# Patient Record
Sex: MALE | Race: WHITE
[De-identification: names, ages, dates, MRNs, and addresses within clinical notes are randomized per-mention and may not be internally consistent; named-entity substitution may affect disease eponyms.]

---

## 2018-06-25 ENCOUNTER — HOSPITAL ENCOUNTER (EMERGENCY)
Dept: HOSPITAL 97 - ER | Age: 33
Discharge: HOME | End: 2018-06-25
Payer: OTHER GOVERNMENT

## 2018-06-25 DIAGNOSIS — M54.30: Primary | ICD-10-CM

## 2018-06-25 LAB
ALBUMIN SERPL BCP-MCNC: 3.8 G/DL (ref 3.4–5)
ALP SERPL-CCNC: 80 U/L (ref 45–117)
ALT SERPL W P-5'-P-CCNC: 58 U/L (ref 12–78)
AST SERPL W P-5'-P-CCNC: 29 U/L (ref 15–37)
BUN BLD-MCNC: 16 MG/DL (ref 7–18)
GLUCOSE SERPLBLD-MCNC: 84 MG/DL (ref 74–106)
HCT VFR BLD CALC: 45.3 % (ref 39.6–49)
LYMPHOCYTES # SPEC AUTO: 2.9 K/UL (ref 0.7–4.9)
MCH RBC QN AUTO: 30.7 PG (ref 27–35)
MCV RBC: 87.9 FL (ref 80–100)
PMV BLD: 9.9 FL (ref 7.6–11.3)
POTASSIUM SERPL-SCNC: 3.4 MMOL/L (ref 3.5–5.1)
RBC # BLD: 5.15 M/UL (ref 4.33–5.43)

## 2018-06-25 PROCEDURE — 96361 HYDRATE IV INFUSION ADD-ON: CPT

## 2018-06-25 PROCEDURE — 81003 URINALYSIS AUTO W/O SCOPE: CPT

## 2018-06-25 PROCEDURE — 72131 CT LUMBAR SPINE W/O DYE: CPT

## 2018-06-25 PROCEDURE — 80053 COMPREHEN METABOLIC PANEL: CPT

## 2018-06-25 PROCEDURE — 96374 THER/PROPH/DIAG INJ IV PUSH: CPT

## 2018-06-25 PROCEDURE — 99284 EMERGENCY DEPT VISIT MOD MDM: CPT

## 2018-06-25 PROCEDURE — 82962 GLUCOSE BLOOD TEST: CPT

## 2018-06-25 PROCEDURE — 85025 COMPLETE CBC W/AUTO DIFF WBC: CPT

## 2018-06-25 PROCEDURE — 36415 COLL VENOUS BLD VENIPUNCTURE: CPT

## 2018-06-25 PROCEDURE — 96375 TX/PRO/DX INJ NEW DRUG ADDON: CPT

## 2018-06-25 NOTE — EDPHYS
Physician Documentation                                                                           

 [facility]                                                                

Name: Rick Cifuentes                                                                                 

Age: 32 yrs                                                                                       

Sex: Male                                                                                         

: 1985                                                                                   

MRN: [id number]                                                                                   

Arrival Date: 2018                                                                          

Time: 17:39                                                                                       

Account#: [id number]                                                                            

Bed 6                                                                                             

Private MD: None, None                                                                            

ED Physician Eliceo Siddiqi                                                                      

HPI:                                                                                              

                                                                                             

18:14 This 32 yrs old  Male presents to ER via Ambulatory with complaints of Back    louisa 

      Pain.                                                                                       

18:14 The patient presents with pain that is acute. The symptoms are located in the lumbar    louisa 

      area, left low back, left mid back, right mid back and right low back. Onset: The           

      symptoms/episode began/occurred 2 day(s) ago. The pain does not radiate. Associated         

      signs and symptoms: The patient has no apparent associated signs or symptoms. Modifying     

      factors: The patient symptoms are alleviated by remaining still, the patient symptoms       

      are aggravated by bending. Severity of symptoms: At their worst the symptoms were           

      moderate, in the emergency department the symptoms are unchanged.                           

                                                                                                  

Historical:                                                                                       

- Allergies:                                                                                      

17:59 No Known Allergies;                                                                     aj  

- Home Meds:                                                                                      

17:59 None [Active];                                                                          aj  

- PMHx:                                                                                           

17:59 Back pain;                                                                              aj  

- PSHx:                                                                                           

17:59 None;                                                                                   aj  

                                                                                                  

- Immunization history:: Adult Immunizations up to date.                                          

- Social history:: Smoking status: Patient/guardian denies using tobacco.                         

- Ebola Screening: : Patient negative for fever greater than or equal to 101.5 degrees            

  Fahrenheit, and additional compatible Ebola Virus Disease symptoms Patient denies               

  exposure to infectious person Patient denies travel to an Ebola-affected area in the            

  21 days before illness onset No symptoms or risks identified at this time.                      

- Family history:: not pertinent.                                                                 

                                                                                                  

                                                                                                  

ROS:                                                                                              

18:14 Constitutional: Negative for fever, chills, and weight loss, Eyes: Negative for injury, louisa 

      pain, redness, and discharge, ENT: Negative for injury, pain, and discharge, Neck:          

      Negative for injury, pain, and swelling, Cardiovascular: Negative for chest pain,           

      palpitations, and edema, Respiratory: Negative for shortness of breath, cough,              

      wheezing, and pleuritic chest pain, Abdomen/GI: Negative for abdominal pain, nausea,        

      vomiting, diarrhea, and constipation, : Negative for injury, bleeding, discharge, and     

      swelling, MS/Extremity: Negative for injury and deformity, Skin: Negative for injury,       

      rash, and discoloration, Neuro: Negative for headache, weakness, numbness, tingling,        

      and seizure, Psych: Negative for depression, anxiety, suicide ideation, homicidal           

      ideation, and hallucinations, Allergy/Immunology: Negative for hives, rash, and             

      allergies, Endocrine: Negative for neck swelling, polydipsia, polyuria, polyphagia, and     

      marked weight changes, Hematologic/Lymphatic: Negative for swollen nodes, abnormal          

      bleeding, and unusual bruising.                                                             

18:14 Back: Positive for decreased range of motion, pain with movement, of the lumbar area,       

      left low back, left mid back, right mid back and right low back.                            

                                                                                                  

Exam:                                                                                             

18:14 Constitutional:  This is a well developed, well nourished patient who is awake, alert,  louisa 

      and in no acute distress. Head/Face:  Normocephalic, atraumatic. Eyes:  Pupils equal        

      round and reactive to light, extra-ocular motions intact.  Lids and lashes normal.          

      Conjunctiva and sclera are non-icteric and not injected.  Cornea within normal limits.      

      Periorbital areas with no swelling, redness, or edema. ENT:  Nares patent. No nasal         

      discharge, no septal abnormalities noted.  Tympanic membranes are normal and external       

      auditory canals are clear.  Oropharynx with no redness, swelling, or masses, exudates,      

      or evidence of obstruction, uvula midline.  Mucous membranes moist. Neck:  Trachea          

      midline, no thyromegaly or masses palpated, and no cervical lymphadenopathy.  Supple,       

      full range of motion without nuchal rigidity, or vertebral point tenderness.  No            

      Meningismus. Chest/axilla:  Normal chest wall appearance and motion.  Nontender with no     

      deformity.  No lesions are appreciated. Cardiovascular:  Regular rate and rhythm with a     

      normal S1 and S2.  No gallops, murmurs, or rubs.  Normal PMI, no JVD.  No pulse             

      deficits. Respiratory:  Lungs have equal breath sounds bilaterally, clear to                

      auscultation and percussion.  No rales, rhonchi or wheezes noted.  No increased work of     

      breathing, no retractions or nasal flaring. Abdomen/GI:  Soft, non-tender, with normal      

      bowel sounds.  No distension or tympany.  No guarding or rebound.  No evidence of           

      tenderness throughout. Male :  Normal genitalia with no discharge or lesions. Skin:       

      Warm, dry with normal turgor.  Normal color with no rashes, no lesions, and no evidence     

      of cellulitis. MS/ Extremity:  Pulses equal, no cyanosis.  Neurovascular intact.  Full,     

      normal range of motion. Neuro:  Awake and alert, GCS 15, oriented to person, place,         

      time, and situation.  Cranial nerves II-XII grossly intact.  Motor strength 5/5 in all      

      extremities.  Sensory grossly intact.  Cerebellar exam normal.  Normal gait. Psych:         

      Awake, alert, with orientation to person, place and time.  Behavior, mood, and affect       

      are within normal limits.                                                                   

18:14 Back: pain, that is mild, ROM is painful, normal spinal alignment noted, CVA                

      tenderness, is absent, muscle spasm, is appreciated in the left low back, left mid          

      back, right mid back and right low back.                                                    

                                                                                                  

Vital Signs:                                                                                      

17:59  / 93; Pulse 79; Resp 18; Temp 98.1; Pulse Ox 95% on R/A; Weight 108.86 kg;       aj  

      Height 5 ft. 9 in. (175.26 cm);                                                             

17:59 Body Mass Index 35.44 (108.86 kg, 175.26 cm)                                              

                                                                                                  

MDM:                                                                                              

18:02 Patient medically screened.                                                             [facility] 

18:48 Data reviewed: vital signs, nurses notes, lab test result(s), radiologic studies, CT    louisa 

      scan.                                                                                       

                                                                                                  

                                                                                             

18:14 Order name: CBC with Diff; Complete Time: 19:08                                         [facility] 

                                                                                             

18:14 Order name: Comprehensive Metabolic Panel                                               [facility] 

                                                                                             

18:14 Order name: CT Lumbar Spine Wo Con; Complete Time: 19:21                                [facility] 

                                                                                             

18:54 Order name: Urine Dipstick--Ancillary (enter results)                                   bd  

                                                                                                  

Administered Medications:                                                                         

18:45 Drug: NS 0.9% 1000 ml Route: IV; Rate: 1 bolus; Site: right antecubital;                sv  

19:42 Follow up: IV Status: Completed infusion                                                rv  

18:45 Drug: Zofran 4 mg Route: IVP; Site: right antecubital;                                  sv  

18:47 Drug: morphine 4 mg Route: IVP; Site: right antecubital;                                sv  

19:41 Follow up: Response: Pain is decreased                                                  rv  

18:49 Drug: Decadron - Dexamethasone 10 mg Route: IVP; Site: right antecubital;               sv  

19:41 Follow up: Response: No adverse reaction; Pain is decreased                             rv  

18:50 Drug: Valium 5 mg Route: PO;                                                            sv  

19:41 Follow up: Response: No adverse reaction                                                rv  

18:51 Drug: TORadol 30 mg Route: IVP; Site: right antecubital;                                sv  

19:41 Follow up: Response: No adverse reaction; Pain is decreased                             rv  

                                                                                                  

                                                                                                  

Disposition:                                                                                      

18 19:23 Discharged to Home. Impression: Low back pain, Sciatica.                           

- Condition is Stable.                                                                            

- Discharge Instructions: Back Pain, Adult, Chronic Back Pain, Musculoskeletal Pain,              

  Back Injury Prevention, Easy-to-Read, Back Pain, Adult, Easy-to-Read.                           

- Prescriptions for Ibuprofen 600 mg Oral Tablet - take 1 tablet by ORAL route every 6            

  hours As needed take with food; 30 tablet. Tylenol- Codeine #3 300-30 mg Oral Tablet            

  - take 2 tablet by ORAL route every 6 hours As needed; 30 tablet. Valium 5 mg Oral              

  Tablet - take 1 tablet by ORAL route every 8 hours As needed; 20 tablet. Medrol (Davide)           

  4 mg Oral Tablets, Dose Pack - take 1 tablet by ORAL route as directed - follow                 

  package instructions; 1 packet.                                                                 

- Medication Reconciliation Form, Thank You Letter, Antibiotic Education, Prescription            

  Opioid Use form.                                                                                

- Follow up: Private Physician; When: 2 - 3 days; Reason: Recheck today's complaints,             

  Continuance of care, Re-evaluation by your physician. Follow up: Curtis Luis; When:            

  2 - 3 days; Reason: Recheck today's complaints, Re-evaluation by your physician.                

- Problem is new.                                                                                 

- Symptoms have improved.                                                                         

                                                                                                  

                                                                                                  

                                                                                                  

Signatures:                                                                                       

Dispatcher [employer]                           Maria A Villa RN RN sv Myers, Amanda, RN RN aj Anderson, Corey, MD MD cha Vicente, Ronaldo, RN                    RN   rv                                                   

                                                                                                  

Corrections: (The following items were deleted from the chart)                                    

19:42 19:23 2018 19:23 Discharged to Home. Impression: Low back pain; Sciatica.         rv  

      Condition is Stable. Discharge Instructions: Back Pain, Adult, Chronic Back Pain,           

      Musculoskeletal Pain, Back Injury Prevention, Easy-to-Read, Back Pain, Adult,               

      Easy-to-Read. Prescriptions for Ibuprofen 600 mg Oral Tablet - take 1 tablet by ORAL        

      route every 6 hours As needed take with food; 30 tablet, Tylenol-Codeine #3 300-30 mg       

      Oral Tablet - take 2 tablet by ORAL route every 6 hours As needed; 30 tablet, Valium 5      

      mg Oral Tablet - take 1 tablet by ORAL route every 8 hours As needed; 20 tablet, Medrol     

      (Davide) 4 mg Oral Tablets, Dose Pack - take 1 tablet by ORAL route as directed - follow       

      package instructions; 1 packet. and Forms are Medication Reconciliation Form, Thank You     

      Letter, Antibiotic Education, Prescription Opioid Use. Follow up: Private Physician;        

      When: 2 - 3 days; Reason: Recheck today's complaints, Continuance of care,                  

      Re-evaluation by your physician. Follow up: Curtis Luis; When: 2 - 3 days; Reason:         

      Recheck today's complaints, Re-evaluation by your physician. Problem is new. Symptoms       

      have improved. louisa                                                                          

                                                                                                  

**************************************************************************************************

## 2018-06-25 NOTE — ER
Nurse's Notes                                                                                     

 [facility]                                                                

Name: Rick Cifuentes                                                                                 

Age: 32 yrs                                                                                       

Sex: Male                                                                                         

: 1985                                                                                   

MRN: [id number]                                                                                   

Arrival Date: 2018                                                                          

Time: 17:39                                                                                       

Account#: [id number]                                                                            

Bed 6                                                                                             

Private MD: None, None                                                                            

Diagnosis: Low back pain;Sciatica                                                                 

                                                                                                  

Presentation:                                                                                     

                                                                                             

17:58 Presenting complaint: Patient states: Low back pain for 1 week, HX of chronic back      aj  

      pain. Transition of care: patient was not received from another setting of care. Onset      

      of symptoms was 2018. Risk Assessment: Do you want to hurt yourself or someone     

      else? Patient reports no desire to harm self or others. Initial Sepsis Screen: Does the     

      patient meet any 2 criteria? No. Patient's initial sepsis screen is negative. Does the      

      patient have a suspected source of infection? No. Patient's initial sepsis screen is        

      negative. Care prior to arrival: None.                                                      

17:58 Method Of Arrival: Ambulatory                                                           aj  

17:58 Acuity: BATSHEVA 4                                                                           aj  

                                                                                                  

Triage Assessment:                                                                                

17:59 General: Appears in no apparent distress. comfortable, Behavior is calm, cooperative,   aj  

      appropriate for age. Pain: Complains of pain in low back area and mid back area. Neuro:     

      Level of Consciousness is awake, alert, obeys commands, Oriented to person, place,          

      time, situation, Appropriate for age. Respiratory: Airway is patent Respiratory effort      

      is even, unlabored, Respiratory pattern is regular, symmetrical. Derm: Skin is intact,      

      is healthy with good turgor, Skin is pink, warm \T\ dry. normal. Musculoskeletal:           

      Circulation, motion, and sensation intact. Range of motion: intact in all extremities,      

      Reports pain in low back area and mid back area.                                            

                                                                                                  

Historical:                                                                                       

- Allergies:                                                                                      

17:59 No Known Allergies;                                                                     aj  

- Home Meds:                                                                                      

17:59 None [Active];                                                                          aj  

- PMHx:                                                                                           

17:59 Back pain;                                                                              aj  

- PSHx:                                                                                           

17:59 None;                                                                                   aj  

                                                                                                  

- Immunization history:: Adult Immunizations up to date.                                          

- Social history:: Smoking status: Patient/guardian denies using tobacco.                         

- Ebola Screening: : Patient negative for fever greater than or equal to 101.5 degrees            

  Fahrenheit, and additional compatible Ebola Virus Disease symptoms Patient denies               

  exposure to infectious person Patient denies travel to an Ebola-affected area in the            

  21 days before illness onset No symptoms or risks identified at this time.                      

- Family history:: not pertinent.                                                                 

                                                                                                  

                                                                                                  

Screenin:45 Abuse screen: Denies threats or abuse. Denies injuries from another. Nutritional        sv  

      screening: No deficits noted. Tuberculosis screening: No symptoms or risk factors           

      identified. Fall Risk None identified.                                                      

                                                                                                  

Vital Signs:                                                                                      

17:59  / 93; Pulse 79; Resp 18; Temp 98.1; Pulse Ox 95% on R/A; Weight 108.86 kg;       aj  

      Height 5 ft. 9 in. (175.26 cm);                                                             

17:59 Body Mass Index 35.44 (108.86 kg, 175.26 cm)                                              

                                                                                                  

ED Course:                                                                                        

17:39 Patient arrived in ED.                                                                  sb2 

17:39 None, None is Private Physician.                                                        sb2 

17:58 Triage completed.                                                                       aj  

17:59 Arm band placed on left wrist. Patient placed in an exam room, on a stretcher.          aj  

18:02 Eliceo Siddiqi MD is Attending Physician.                                             louisa 

18:21 Patient moved to CT via wheelchair.                                                     vm2 

18:30 Inserted saline lock: 22 gauge in right forearm, using aseptic technique. Blood         jp3 

      collected.                                                                                  

18:40 Initial lab(s) drawn, by me, sent to lab. Urine collected: clean catch specimen, clear, jp3 

      que colored.                                                                              

18:45 Patient has correct armband on for positive identification. Bed in low position. Call   sv  

      light in reach. Side rails up X2. Door closed.                                              

19:11 CT Lumbar Spine Wo Con In Process Unspecified.                                          EDMS

19:12 Jonathan Blandon, RN is Primary Nurse.                                                    bp  

19:19 CT completed. Patient tolerated procedure well. Patient moved back from CT.             nj  

19:22 Curtis Luis MD is Referral Physician.                                                louisa 

19:39 No provider procedures requiring assistance completed. IV discontinued, bleeding        rv  

      controlled, No redness/swelling at site. Pressure dressing applied.                         

                                                                                                  

Administered Medications:                                                                         

18:45 Drug: NS 0.9% 1000 ml Route: IV; Rate: 1 bolus; Site: right antecubital;                sv  

19:42 Follow up: IV Status: Completed infusion                                                rv  

18:45 Drug: Zofran 4 mg Route: IVP; Site: right antecubital;                                  sv  

18:47 Drug: morphine 4 mg Route: IVP; Site: right antecubital;                                sv  

19:41 Follow up: Response: Pain is decreased                                                  rv  

18:49 Drug: Decadron - Dexamethasone 10 mg Route: IVP; Site: right antecubital;               sv  

19:41 Follow up: Response: No adverse reaction; Pain is decreased                             rv  

18:50 Drug: Valium 5 mg Route: PO;                                                            sv  

19:41 Follow up: Response: No adverse reaction                                                rv  

18:51 Drug: TORadol 30 mg Route: IVP; Site: right antecubital;                                sv  

19:41 Follow up: Response: No adverse reaction; Pain is decreased                             rv  

                                                                                                  

                                                                                                  

Outcome:                                                                                          

19:23 Discharge ordered by MD. jasso 

19:40 Discharged to home ambulatory.                                                          rv  

19:40 Condition: improved                                                                         

19:40 Discharge instructions given to patient, Instructed on discharge instructions,              

      medication usage.                                                                           

19:42 Patient left the ED.                                                                    rv  

                                                                                                  

Signatures:                                                                                       

Dispatcher [employer]                           EDMaria A Salas RN RN sv Myers, Amanda, RN RN aj Anderson, Corey, MD MD cha Jordan, Nathan nj McGuire, Victoria                            2                                                  

Jonathan Blandon RN RN bp Billeau, Sheri                               2                                                  

Washington Bearden RN                    RN                                                      

Griffin Torres jp3                                                  

                                                                                                  

**************************************************************************************************

## 2018-06-25 NOTE — RAD REPORT
EXAM DESCRIPTION:  CT - Spine Lumbar Wo Con - 6/25/2018 7:10 pm

 

CLINICAL HISTORY:   Radiculopathy.

PAIN

 

COMPARISON:  No comparisons

 

TECHNIQUE:  Axial noncontrast CT imaging of the lumbar spine was performed with coronal and sagittal 
re-formatted images.

 

All CT scans are performed using dose optimization technique as appropriate and may include automated
 exposure control or mA/KV adjustment according to patient size.

 

FINDINGS:  No acute lumbar spine fracture seen. No aggressive marrow pattern or malalignment.

 

Paraspinal tissues are normal in thickness. No paraspinal abscess or hematoma seen.

 

Intervertebral disc disease assessment is inherently limited by CT. Within these limitations, no high
-grade canal stenosis suspected. Mild bulging of disc material is suspected at the lower lumbar level
s.

 

IMPRESSION:  No acute lumbar spine abnormality.

 

Consider MRI follow-up for assessment of disc disease if clinically desired.